# Patient Record
Sex: MALE | URBAN - METROPOLITAN AREA
[De-identification: names, ages, dates, MRNs, and addresses within clinical notes are randomized per-mention and may not be internally consistent; named-entity substitution may affect disease eponyms.]

---

## 2017-09-08 ENCOUNTER — OFFICE VISIT (OUTPATIENT)
Dept: OTOLARYNGOLOGY | Facility: CLINIC | Age: 67
End: 2017-09-08

## 2017-09-08 VITALS
SYSTOLIC BLOOD PRESSURE: 119 MMHG | DIASTOLIC BLOOD PRESSURE: 79 MMHG | HEIGHT: 67 IN | BODY MASS INDEX: 29.82 KG/M2 | TEMPERATURE: 98 F | WEIGHT: 190 LBS

## 2017-09-08 DIAGNOSIS — R05.9 COUGH: Primary | ICD-10-CM

## 2017-09-08 PROCEDURE — 31575 DIAGNOSTIC LARYNGOSCOPY: CPT | Performed by: OTOLARYNGOLOGY

## 2017-09-08 PROCEDURE — 99212 OFFICE O/P EST SF 10 MIN: CPT | Performed by: OTOLARYNGOLOGY

## 2017-09-08 PROCEDURE — 99203 OFFICE O/P NEW LOW 30 MIN: CPT | Performed by: OTOLARYNGOLOGY

## 2017-09-08 RX ORDER — LORATADINE 10 MG/1
10 TABLET ORAL DAILY
Qty: 30 TABLET | Refills: 3 | Status: SHIPPED | OUTPATIENT
Start: 2017-09-08

## 2017-09-08 RX ORDER — MONTELUKAST SODIUM 10 MG/1
10 TABLET ORAL NIGHTLY
Qty: 30 TABLET | Refills: 3 | Status: SHIPPED | OUTPATIENT
Start: 2017-09-08

## 2017-09-08 RX ORDER — FLUTICASONE PROPIONATE 50 MCG
1 SPRAY, SUSPENSION (ML) NASAL 2 TIMES DAILY
Qty: 1 BOTTLE | Refills: 3 | Status: SHIPPED | OUTPATIENT
Start: 2017-09-08

## 2020-07-06 NOTE — PROGRESS NOTES
Nathan Sims is a 61year old male. Patient presents with:  Snoring: currently using CPAP machine,  Cough: on and off for a while      HISTORY OF PRESENT ILLNESS  10/28/11  He presents with an eight-day history of sudden onset imbalance.  This first Negative Chest pain, irregular heartbeat/palpitations and syncope. GI Negative Abdominal pain and diarrhea. Endocrine Negative Cold intolerance and heat intolerance. Neuro Negative Tremors. Psych Negative Anxiety and depression.    Integumentary Neg Correct side and site confirmed. A topical spray of ). 25% Neosynephrine was sprayed into the nose. Laryngoscopy:  Flexible Fiberoptic Laryngoscopy: A diagnostic flexible fiberoptic laryngoscopy was performed.  The flexible fiberoptic laryngoscope wa none

## 2023-01-10 ENCOUNTER — OFFICE VISIT (OUTPATIENT)
Dept: INTERNAL MEDICINE CLINIC | Facility: CLINIC | Age: 73
End: 2023-01-10
Payer: COMMERCIAL

## 2023-01-10 VITALS
HEART RATE: 93 BPM | HEIGHT: 67 IN | DIASTOLIC BLOOD PRESSURE: 78 MMHG | BODY MASS INDEX: 28.72 KG/M2 | WEIGHT: 183 LBS | SYSTOLIC BLOOD PRESSURE: 130 MMHG

## 2023-01-10 DIAGNOSIS — Z79.4 TYPE 2 DIABETES MELLITUS WITHOUT COMPLICATION, WITH LONG-TERM CURRENT USE OF INSULIN (HCC): Primary | ICD-10-CM

## 2023-01-10 DIAGNOSIS — E11.9 TYPE 2 DIABETES MELLITUS WITHOUT COMPLICATION, WITH LONG-TERM CURRENT USE OF INSULIN (HCC): Primary | ICD-10-CM

## 2023-01-10 DIAGNOSIS — L84 FOOT CALLUS: ICD-10-CM

## 2023-01-10 DIAGNOSIS — E11.9 ENCOUNTER FOR DIABETIC FOOT EXAM (HCC): ICD-10-CM

## 2023-01-10 RX ORDER — PERPHENAZINE 16 MG/1
TABLET, FILM COATED ORAL
Qty: 300 STRIP | Refills: 3 | Status: SHIPPED | OUTPATIENT
Start: 2023-01-10

## 2023-01-10 RX ORDER — PERPHENAZINE 16 MG/1
TABLET, FILM COATED ORAL
COMMUNITY
End: 2023-01-10

## 2023-01-12 ENCOUNTER — LAB ENCOUNTER (OUTPATIENT)
Dept: LAB | Age: 73
End: 2023-01-12
Attending: INTERNAL MEDICINE
Payer: COMMERCIAL

## 2023-01-12 DIAGNOSIS — E11.9 TYPE 2 DIABETES MELLITUS WITHOUT COMPLICATION, WITH LONG-TERM CURRENT USE OF INSULIN (HCC): ICD-10-CM

## 2023-01-12 DIAGNOSIS — Z79.4 TYPE 2 DIABETES MELLITUS WITHOUT COMPLICATION, WITH LONG-TERM CURRENT USE OF INSULIN (HCC): ICD-10-CM

## 2023-01-12 LAB
ALBUMIN SERPL-MCNC: 3.6 G/DL (ref 3.4–5)
ALBUMIN/GLOB SERPL: 1 {RATIO} (ref 1–2)
ALP LIVER SERPL-CCNC: 66 U/L
ALT SERPL-CCNC: 20 U/L
ANION GAP SERPL CALC-SCNC: 9 MMOL/L (ref 0–18)
AST SERPL-CCNC: 9 U/L (ref 15–37)
BASOPHILS # BLD AUTO: 0.07 X10(3) UL (ref 0–0.2)
BASOPHILS NFR BLD AUTO: 1.2 %
BILIRUB SERPL-MCNC: 0.9 MG/DL (ref 0.1–2)
BUN BLD-MCNC: 17 MG/DL (ref 7–18)
BUN/CREAT SERPL: 16.8 (ref 10–20)
CALCIUM BLD-MCNC: 9.2 MG/DL (ref 8.5–10.1)
CHLORIDE SERPL-SCNC: 103 MMOL/L (ref 98–112)
CHOLEST SERPL-MCNC: 165 MG/DL (ref ?–200)
CO2 SERPL-SCNC: 24 MMOL/L (ref 21–32)
CREAT BLD-MCNC: 1.01 MG/DL
CREAT UR-SCNC: 171 MG/DL
DEPRECATED RDW RBC AUTO: 38.2 FL (ref 35.1–46.3)
EOSINOPHIL # BLD AUTO: 0.19 X10(3) UL (ref 0–0.7)
EOSINOPHIL NFR BLD AUTO: 3.2 %
ERYTHROCYTE [DISTWIDTH] IN BLOOD BY AUTOMATED COUNT: 12.2 % (ref 11–15)
EST. AVERAGE GLUCOSE BLD GHB EST-MCNC: 166 MG/DL (ref 68–126)
FASTING PATIENT LIPID ANSWER: YES
FASTING STATUS PATIENT QL REPORTED: YES
GFR SERPLBLD BASED ON 1.73 SQ M-ARVRAT: 80 ML/MIN/1.73M2 (ref 60–?)
GLOBULIN PLAS-MCNC: 3.7 G/DL (ref 2.8–4.4)
GLUCOSE BLD-MCNC: 131 MG/DL (ref 70–99)
HBA1C MFR BLD: 7.4 % (ref ?–5.7)
HCT VFR BLD AUTO: 41.4 %
HDLC SERPL-MCNC: 42 MG/DL (ref 40–59)
HGB BLD-MCNC: 14 G/DL
IMM GRANULOCYTES # BLD AUTO: 0.01 X10(3) UL (ref 0–1)
IMM GRANULOCYTES NFR BLD: 0.2 %
LDLC SERPL CALC-MCNC: 103 MG/DL (ref ?–100)
LYMPHOCYTES # BLD AUTO: 2.3 X10(3) UL (ref 1–4)
LYMPHOCYTES NFR BLD AUTO: 39.1 %
MCH RBC QN AUTO: 28.9 PG (ref 26–34)
MCHC RBC AUTO-ENTMCNC: 33.8 G/DL (ref 31–37)
MCV RBC AUTO: 85.5 FL
MICROALBUMIN UR-MCNC: 0.87 MG/DL
MICROALBUMIN/CREAT 24H UR-RTO: 5.1 UG/MG (ref ?–30)
MONOCYTES # BLD AUTO: 0.55 X10(3) UL (ref 0.1–1)
MONOCYTES NFR BLD AUTO: 9.4 %
NEUTROPHILS # BLD AUTO: 2.76 X10 (3) UL (ref 1.5–7.7)
NEUTROPHILS # BLD AUTO: 2.76 X10(3) UL (ref 1.5–7.7)
NEUTROPHILS NFR BLD AUTO: 46.9 %
NONHDLC SERPL-MCNC: 123 MG/DL (ref ?–130)
OSMOLALITY SERPL CALC.SUM OF ELEC: 285 MOSM/KG (ref 275–295)
PLATELET # BLD AUTO: 213 10(3)UL (ref 150–450)
POTASSIUM SERPL-SCNC: 4.3 MMOL/L (ref 3.5–5.1)
PROT SERPL-MCNC: 7.3 G/DL (ref 6.4–8.2)
RBC # BLD AUTO: 4.84 X10(6)UL
SODIUM SERPL-SCNC: 136 MMOL/L (ref 136–145)
TRIGL SERPL-MCNC: 107 MG/DL (ref 30–149)
TSI SER-ACNC: 1.36 MIU/ML (ref 0.36–3.74)
VLDLC SERPL CALC-MCNC: 18 MG/DL (ref 0–30)
WBC # BLD AUTO: 5.9 X10(3) UL (ref 4–11)

## 2023-01-12 PROCEDURE — 80061 LIPID PANEL: CPT

## 2023-01-12 PROCEDURE — 83036 HEMOGLOBIN GLYCOSYLATED A1C: CPT

## 2023-01-12 PROCEDURE — 85025 COMPLETE CBC W/AUTO DIFF WBC: CPT

## 2023-01-12 PROCEDURE — 82043 UR ALBUMIN QUANTITATIVE: CPT

## 2023-01-12 PROCEDURE — 84443 ASSAY THYROID STIM HORMONE: CPT

## 2023-01-12 PROCEDURE — 36415 COLL VENOUS BLD VENIPUNCTURE: CPT

## 2023-01-12 PROCEDURE — 82570 ASSAY OF URINE CREATININE: CPT

## 2023-01-12 PROCEDURE — 80053 COMPREHEN METABOLIC PANEL: CPT

## 2023-01-31 ENCOUNTER — OFFICE VISIT (OUTPATIENT)
Dept: PODIATRY CLINIC | Facility: CLINIC | Age: 73
End: 2023-01-31

## 2023-01-31 DIAGNOSIS — M79.674 PAIN IN TOES OF BOTH FEET: ICD-10-CM

## 2023-01-31 DIAGNOSIS — B35.1 ONYCHOMYCOSIS: Primary | ICD-10-CM

## 2023-01-31 DIAGNOSIS — M79.675 PAIN IN TOES OF BOTH FEET: ICD-10-CM

## 2023-01-31 DIAGNOSIS — E11.9 TYPE 2 DIABETES MELLITUS WITHOUT RETINOPATHY (HCC): ICD-10-CM

## 2023-01-31 PROCEDURE — 11721 DEBRIDE NAIL 6 OR MORE: CPT | Performed by: PODIATRIST

## 2023-01-31 PROCEDURE — 99203 OFFICE O/P NEW LOW 30 MIN: CPT | Performed by: PODIATRIST

## 2023-02-21 ENCOUNTER — MED REC SCAN ONLY (OUTPATIENT)
Dept: INTERNAL MEDICINE CLINIC | Facility: CLINIC | Age: 73
End: 2023-02-21

## 2023-02-22 ENCOUNTER — OFFICE VISIT (OUTPATIENT)
Dept: OPHTHALMOLOGY | Facility: CLINIC | Age: 73
End: 2023-02-22

## 2023-02-22 DIAGNOSIS — E11.9 TYPE 2 DIABETES MELLITUS WITHOUT RETINOPATHY (HCC): Primary | ICD-10-CM

## 2023-02-22 DIAGNOSIS — H25.13 AGE-RELATED NUCLEAR CATARACT OF BOTH EYES: ICD-10-CM

## 2023-02-22 DIAGNOSIS — H43.392 VITREOUS FLOATERS OF LEFT EYE: ICD-10-CM

## 2023-02-22 PROCEDURE — 92015 DETERMINE REFRACTIVE STATE: CPT | Performed by: OPHTHALMOLOGY

## 2023-02-22 PROCEDURE — 92004 COMPRE OPH EXAM NEW PT 1/>: CPT | Performed by: OPHTHALMOLOGY

## 2023-02-22 PROCEDURE — 2023F DILAT RTA XM W/O RTNOPTHY: CPT | Performed by: OPHTHALMOLOGY

## 2023-02-22 NOTE — ASSESSMENT & PLAN NOTE
Discussed mild cataracts with patient. No treatment recommended at this time.      New glasses Rx given today, recommend update

## 2023-02-22 NOTE — PATIENT INSTRUCTIONS
Type 2 diabetes mellitus without retinopathy ([facility] [address]  Diabetes type II: no background of retinopathy, no signs of neovascularization noted. Discussed ocular and systemic benefits of blood sugar control. Diagnosis and treatment discussed in detail with patient. Will see patient in 1 year for a diabetic exam    Age-related nuclear cataract of both eyes  Discussed mild cataracts with patient. No treatment recommended at this time. New glasses Rx given today, recommend update      Vitreous floaters of left eye   There is no evidence of retinal pathology. All signs and symptoms of retinal detachment/tears explained in detail. Patient instructed to call the office if they experience increase in floaters, increase in flashes of light, loss of vision or curtain or veil effect.

## 2023-08-03 ENCOUNTER — MED REC SCAN ONLY (OUTPATIENT)
Dept: INTERNAL MEDICINE CLINIC | Facility: CLINIC | Age: 73
End: 2023-08-03

## 2023-08-18 DIAGNOSIS — E11.9 TYPE 2 DIABETES MELLITUS WITHOUT COMPLICATION, WITH LONG-TERM CURRENT USE OF INSULIN (HCC): ICD-10-CM

## 2023-08-18 DIAGNOSIS — Z79.4 TYPE 2 DIABETES MELLITUS WITHOUT COMPLICATION, WITH LONG-TERM CURRENT USE OF INSULIN (HCC): ICD-10-CM

## 2023-08-18 NOTE — TELEPHONE ENCOUNTER
Failed Protocol/No Protocol.     Requested Prescriptions   Pending Prescriptions Disp Refills    METFORMIN 500 MG Oral Tab [Pharmacy Med Name: METFORMIN  MG TABLET] 270 tablet 1     Sig: TAKE 1 TABLET BY MOUTH DAILY WITH BREAKFAST AND 2 TABLETS WITH DINNER       Diabetes Medication Protocol Failed - 8/18/2023 12:22 AM        Failed - Last A1C < 7.5 and within past 6 months     Lab Results   Component Value Date    A1C 7.4 (H) 01/12/2023             Passed - In person appointment or virtual visit in the past 6 mos or appointment in next 3 mos     Recent Outpatient Visits              5 months ago Type 2 diabetes mellitus without retinopathy ([facility] [address]    [facility]    [facility]    5 months ago Reactive depression    [address], [facility], [place] Leonides Gouge, MD    Office Visit    6 months ago [facility], [place], [place], [place]    Office Visit    7 months ago Type 2 diabetes mellitus without complication, with long-term current use of insulin ([address]    [address],[facility], [facility], [place] Leonides Gouge, MD    Office Visit    5 years ago [facility]ough    [facility]    [facility]                      Passed - [facility] or GFRNAA > 50     GFR Evaluation  EGFRCR: 80 , resulted on 1/12/2023          Passed - GFR in the past 12 months               Recent Outpatient Visits              5 months ago Type 2 diabetes mellitus without retinopathy ([address]    [facility]    Office Visit    5 months ago Reactive depression    [address] [facility], [place] Leonides Gouge, MD    Office Visit    6 months ago [facility]    [facility], [place], [place], [place]    Office Visit    7 months ago Type 2 diabetes mellitus without complication, with long-term current use of insulin (HCC)    [address], [address], [place] Joaquina Carbone MD    Office Visit    5 years ago [facility], [address], Ina Landaverde MD    Office Visit

## 2023-12-20 ENCOUNTER — TELEPHONE (OUTPATIENT)
Dept: INTERNAL MEDICINE CLINIC | Facility: CLINIC | Age: 73
End: 2023-12-20

## 2023-12-20 DIAGNOSIS — E11.9 TYPE 2 DIABETES MELLITUS WITHOUT COMPLICATION, WITH LONG-TERM CURRENT USE OF INSULIN (HCC): Primary | ICD-10-CM

## 2023-12-20 DIAGNOSIS — Z79.4 TYPE 2 DIABETES MELLITUS WITHOUT COMPLICATION, WITH LONG-TERM CURRENT USE OF INSULIN (HCC): Primary | ICD-10-CM

## 2024-01-18 ENCOUNTER — LAB ENCOUNTER (OUTPATIENT)
Dept: LAB | Age: 74
End: 2024-01-18
Attending: INTERNAL MEDICINE
Payer: COMMERCIAL

## 2024-01-18 DIAGNOSIS — E11.9 TYPE 2 DIABETES MELLITUS WITHOUT COMPLICATION, WITH LONG-TERM CURRENT USE OF INSULIN (HCC): ICD-10-CM

## 2024-01-18 DIAGNOSIS — Z79.4 TYPE 2 DIABETES MELLITUS WITHOUT COMPLICATION, WITH LONG-TERM CURRENT USE OF INSULIN (HCC): ICD-10-CM

## 2024-01-18 LAB
ALBUMIN SERPL-MCNC: 4.2 G/DL (ref 3.2–4.8)
ALBUMIN/GLOB SERPL: 1.4 {RATIO} (ref 1–2)
ALP LIVER SERPL-CCNC: 68 U/L
ALT SERPL-CCNC: 14 U/L
ANION GAP SERPL CALC-SCNC: 3 MMOL/L (ref 0–18)
AST SERPL-CCNC: 12 U/L (ref ?–34)
BASOPHILS # BLD AUTO: 0.08 X10(3) UL (ref 0–0.2)
BASOPHILS NFR BLD AUTO: 1.4 %
BILIRUB SERPL-MCNC: 1 MG/DL (ref 0.2–1.1)
BUN BLD-MCNC: 14 MG/DL (ref 9–23)
BUN/CREAT SERPL: 12.5 (ref 10–20)
CALCIUM BLD-MCNC: 9.3 MG/DL (ref 8.7–10.4)
CHLORIDE SERPL-SCNC: 103 MMOL/L (ref 98–112)
CHOLEST SERPL-MCNC: 187 MG/DL (ref ?–200)
CO2 SERPL-SCNC: 30 MMOL/L (ref 21–32)
CREAT BLD-MCNC: 1.12 MG/DL
CREAT UR-SCNC: 66 MG/DL
DEPRECATED RDW RBC AUTO: 38.8 FL (ref 35.1–46.3)
EGFRCR SERPLBLD CKD-EPI 2021: 70 ML/MIN/1.73M2 (ref 60–?)
EOSINOPHIL # BLD AUTO: 0.34 X10(3) UL (ref 0–0.7)
EOSINOPHIL NFR BLD AUTO: 5.8 %
ERYTHROCYTE [DISTWIDTH] IN BLOOD BY AUTOMATED COUNT: 12.6 % (ref 11–15)
EST. AVERAGE GLUCOSE BLD GHB EST-MCNC: 154 MG/DL (ref 68–126)
FASTING PATIENT LIPID ANSWER: YES
FASTING STATUS PATIENT QL REPORTED: YES
GLOBULIN PLAS-MCNC: 3 G/DL (ref 2.8–4.4)
GLUCOSE BLD-MCNC: 128 MG/DL (ref 70–99)
HBA1C MFR BLD: 7 % (ref ?–5.7)
HCT VFR BLD AUTO: 43.3 %
HDLC SERPL-MCNC: 41 MG/DL (ref 40–59)
HGB BLD-MCNC: 14.6 G/DL
IMM GRANULOCYTES # BLD AUTO: 0.01 X10(3) UL (ref 0–1)
IMM GRANULOCYTES NFR BLD: 0.2 %
LDLC SERPL CALC-MCNC: 128 MG/DL (ref ?–100)
LYMPHOCYTES # BLD AUTO: 2.23 X10(3) UL (ref 1–4)
LYMPHOCYTES NFR BLD AUTO: 38.1 %
MCH RBC QN AUTO: 28.5 PG (ref 26–34)
MCHC RBC AUTO-ENTMCNC: 33.7 G/DL (ref 31–37)
MCV RBC AUTO: 84.4 FL
MICROALBUMIN UR-MCNC: <0.3 MG/DL
MONOCYTES # BLD AUTO: 0.49 X10(3) UL (ref 0.1–1)
MONOCYTES NFR BLD AUTO: 8.4 %
NEUTROPHILS # BLD AUTO: 2.7 X10 (3) UL (ref 1.5–7.7)
NEUTROPHILS # BLD AUTO: 2.7 X10(3) UL (ref 1.5–7.7)
NEUTROPHILS NFR BLD AUTO: 46.1 %
NONHDLC SERPL-MCNC: 146 MG/DL (ref ?–130)
OSMOLALITY SERPL CALC.SUM OF ELEC: 284 MOSM/KG (ref 275–295)
PLATELET # BLD AUTO: 243 10(3)UL (ref 150–450)
POTASSIUM SERPL-SCNC: 4.7 MMOL/L (ref 3.5–5.1)
PROT SERPL-MCNC: 7.2 G/DL (ref 5.7–8.2)
RBC # BLD AUTO: 5.13 X10(6)UL
SODIUM SERPL-SCNC: 136 MMOL/L (ref 136–145)
TRIGL SERPL-MCNC: 100 MG/DL (ref 30–149)
VLDLC SERPL CALC-MCNC: 18 MG/DL (ref 0–30)
WBC # BLD AUTO: 5.9 X10(3) UL (ref 4–11)

## 2024-01-18 PROCEDURE — 82570 ASSAY OF URINE CREATININE: CPT

## 2024-01-18 PROCEDURE — 80053 COMPREHEN METABOLIC PANEL: CPT

## 2024-01-18 PROCEDURE — 36415 COLL VENOUS BLD VENIPUNCTURE: CPT

## 2024-01-18 PROCEDURE — 83036 HEMOGLOBIN GLYCOSYLATED A1C: CPT

## 2024-01-18 PROCEDURE — 80061 LIPID PANEL: CPT

## 2024-01-18 PROCEDURE — 82043 UR ALBUMIN QUANTITATIVE: CPT

## 2024-01-18 PROCEDURE — 85025 COMPLETE CBC W/AUTO DIFF WBC: CPT

## 2024-02-01 ENCOUNTER — LAB SERVICES (OUTPATIENT)
Dept: LAB | Age: 74
End: 2024-02-01

## 2024-02-15 ENCOUNTER — OFFICE VISIT (OUTPATIENT)
Dept: INTERNAL MEDICINE CLINIC | Facility: CLINIC | Age: 74
End: 2024-02-15
Payer: MEDICARE

## 2024-02-15 VITALS
BODY MASS INDEX: 27.78 KG/M2 | DIASTOLIC BLOOD PRESSURE: 84 MMHG | SYSTOLIC BLOOD PRESSURE: 137 MMHG | HEIGHT: 67 IN | HEART RATE: 75 BPM | WEIGHT: 177 LBS

## 2024-02-15 DIAGNOSIS — E11.9 DIABETIC EYE EXAM (HCC): ICD-10-CM

## 2024-02-15 DIAGNOSIS — Z79.4 TYPE 2 DIABETES MELLITUS WITHOUT COMPLICATION, WITH LONG-TERM CURRENT USE OF INSULIN (HCC): Primary | ICD-10-CM

## 2024-02-15 DIAGNOSIS — E78.00 PURE HYPERCHOLESTEROLEMIA: ICD-10-CM

## 2024-02-15 DIAGNOSIS — H25.13 AGE-RELATED NUCLEAR CATARACT OF BOTH EYES: ICD-10-CM

## 2024-02-15 DIAGNOSIS — Z01.00 DIABETIC EYE EXAM (HCC): ICD-10-CM

## 2024-02-15 DIAGNOSIS — E11.9 TYPE 2 DIABETES MELLITUS WITHOUT COMPLICATION, WITH LONG-TERM CURRENT USE OF INSULIN (HCC): Primary | ICD-10-CM

## 2024-02-15 PROBLEM — E78.1 PURE HYPERTRIGLYCERIDEMIA: Status: ACTIVE | Noted: 2024-02-15

## 2024-02-15 PROCEDURE — 99214 OFFICE O/P EST MOD 30 MIN: CPT | Performed by: INTERNAL MEDICINE

## 2024-02-15 NOTE — PROGRESS NOTES
Subjective:   Patient ID: Anjamraj Liam is a 73 year old male.  Chief Complaint   Patient presents with    Test Results     Here to discuss test results from 1/18/24     Patient presents today for f/u  exam after labs .patient   states doing well otherwise, patient states he is taking his medications regularly is most of the time using his metformin 500 mg twice daily -patient states he is monitoring his sugars and they are in pretty good range at this time.    denies chest pain, shortness of breath, dyspnea on exertion or heart palpitations also denies nausea, vomiting, diarrhea, constipation or abdominal pain. No fever, chills, or UTI symptoms.   The rest of the review of systems, see below.       HPI    History/Other:   Review of Systems   Constitutional:  Negative for chills, fatigue and fever.   HENT:  Negative for ear pain and sore throat.    Eyes:  Negative for pain and redness.   Respiratory:  Negative for cough, shortness of breath and wheezing.    Cardiovascular:  Negative for chest pain, palpitations and leg swelling.   Gastrointestinal:  Negative for abdominal pain, constipation, diarrhea, nausea and vomiting.   Genitourinary:  Negative for dysuria and frequency.   Skin:  Negative for pallor.   Neurological:  Negative for dizziness and headaches.   Psychiatric/Behavioral:  Negative for confusion. The patient is not nervous/anxious.      Current Outpatient Medications   Medication Sig Dispense Refill    metFORMIN 500 MG Oral Tab TAKE 1 TABLET BY MOUTH DAILY WITH BREAKFAST AND 2 TABLETS WITH DINNER 270 tablet 1    Glucose Blood (CONTOUR NEXT TEST) In Vitro Strip One test every other day 300 strip 3     Allergies:No Known Allergies    Objective:   Physical Exam  Vitals and nursing note reviewed.   Constitutional:       General: He is not in acute distress.     Appearance: He is well-developed.      Interventions: Face mask in place.   HENT:      Head: Normocephalic and atraumatic.      Right Ear:  Tympanic membrane, ear canal and external ear normal.      Left Ear: Tympanic membrane, ear canal and external ear normal.      Nose:      Right Sinus: No maxillary sinus tenderness or frontal sinus tenderness.      Left Sinus: No maxillary sinus tenderness or frontal sinus tenderness.   Eyes:      General: No scleral icterus.        Right eye: No discharge.         Left eye: No discharge.   Neck:      Thyroid: No thyromegaly.      Vascular: No JVD.   Cardiovascular:      Rate and Rhythm: Normal rate and regular rhythm.      Heart sounds: Normal heart sounds. No murmur heard.  Pulmonary:      Effort: Pulmonary effort is normal. No respiratory distress.      Breath sounds: Normal breath sounds. No wheezing or rales.   Abdominal:      Palpations: Abdomen is soft. There is no mass.      Tenderness: There is no abdominal tenderness. There is no right CVA tenderness or left CVA tenderness.   Musculoskeletal:      Cervical back: Neck supple.      Right lower leg: No edema.      Left lower leg: No edema.   Lymphadenopathy:      Cervical: No cervical adenopathy.   Skin:     General: Skin is warm and dry.   Neurological:      Mental Status: He is alert and oriented to person, place, and time.   Psychiatric:         Behavior: Behavior normal.      Comments:        Blood pressure 137/84, pulse 75, height 5' 7\" (1.702 m), weight 177 lb (80.3 kg).  Bilateral barefoot skin diabetic exam is normal, visualized feet and the appearance is normal.  Bilateral monofilament/sensation of both feet is normal.  Pulsation pedal pulse exam of both lower legs/feet is normal as well.         Assessment & Plan:        Type 2 diabetes mellitus without complication, with long-term current use of insulin (HCC)  (primary encounter diagnosis)    Keep sugars glycemic control to prevent complications of DM2  Keep 1800 chicho ADA- Diabetic diet   Walking and activity as tolerated   accu-checks   Eye exam 2022 referred  -to have regular eye exams patient  says he has appointment with eye doctor tomorrow   and foot exam yearly -refer to podiatry  Take medications as prescribed  metformin 500  Mg w  breakfast and 1-   Tab With diner   Last A1c is at 7.0  1/24  keep with low sugar and carbohydrate diet continue with metformin 1 tablet in the morning and 1 tablets at night of the sugars.  Keep the fasting sugar in the 120-130 range, 2 hours after meal less than 140 preferably 120- 130 range   Labs to complete 3 months   Discussed with patient regarding ACE inhibitor as kidney protection-due to diabetes starting a small dosage patient is not interested at this time-.  Directions and side effects of medications discussed w/pt  Pt verbalized understanding and compliance      Hyperlipidemia  Keep low saturated fat  diet   Avoid red meat and fast, fried food  Take fruits and vegetables   Keep active /walking ,exercise as  tolerated  Reach healthy weight   Continue present management   Labs  To comlete   Patient again refused to take any cholesterol medication even though he has diabetes.    Patient states he tried medication before and he did not feel good on the statins he reviewed refused to take any medication at this time.  Within 3 months     Recommend patient to have the physical exam scheduled    f/u blood test  in  3 months   Patient agrees with plan verbalized understanding and compliance          No orders of the defined types were placed in this encounter.      Meds This Visit:  Requested Prescriptions      No prescriptions requested or ordered in this encounter       Imaging & Referrals:  None

## 2024-04-08 DIAGNOSIS — E11.9 TYPE 2 DIABETES MELLITUS WITHOUT COMPLICATION, WITH LONG-TERM CURRENT USE OF INSULIN (HCC): ICD-10-CM

## 2024-04-08 DIAGNOSIS — Z79.4 TYPE 2 DIABETES MELLITUS WITHOUT COMPLICATION, WITH LONG-TERM CURRENT USE OF INSULIN (HCC): ICD-10-CM

## 2024-04-09 NOTE — TELEPHONE ENCOUNTER
Refill passed per [facility] protocol.  Requested Prescriptions   Pending Prescriptions Disp Refills    CONTOUR NEXT TEST In Vitro Strip [Pharmacy Med Name: CONTOUR NEXT TEST STRIP] 50 strip 23     Sig: USE TO TEST EVERY OTHER DAY       Diabetic Supplies Protocol Passed - 4/8/2024 12:42 AM        Passed - In person appointment or virtual visit in the past 12 mos or appointment in next 3 mos     Recent Outpatient Visits              1 month ago Type 2 diabetes mellitus without complication, with long-term current use of insulin ([facility])    [facility], Keyanna Garrison MD    Office Visit    1 year ago Type 2 diabetes mellitus without retinopathy ([facility])    [facility]    Office Visit    1 year ago Reactive depression    [facility] [place] V[place]omanovic, Emela, MD    Office Visit    1 year ago Onychomycosis    [facility] [address], [place] Aravind Madison, DPM    Office Visit    1 year ago Type 2 diabetes mellitus without complication, with long-term current use of insulin ([facility])    [facility] [place], Emela, MD    Office Visit                         Recent Outpatient Visits              1 month ago Type 2 diabetes mellitus without complication, with long-term current use of insulin ([facility])    [facility], Keyanna Garrison MD    Office Visit    1 year ago Type 2 diabetes mellitus without retinopathy ([facility])    [facility] Artem Lilly MD    Office Visit    1 year ago Reactive depression    [facility] [place] V[place]omanovic, Emela, MD    Office Visit    1 year ago [facility]ychomycosis    [facility]    Office Visit    1 year ago Type 2  diabetes mellitus without complication, with long-term current use of insulin (HCC)    [facility], [address], [place] Keyanna Foster MD    Office Visit

## 2024-08-15 ENCOUNTER — TELEPHONE (OUTPATIENT)
Dept: INTERNAL MEDICINE CLINIC | Facility: CLINIC | Age: 74
End: 2024-08-15

## 2024-08-15 DIAGNOSIS — Z79.4 TYPE 2 DIABETES MELLITUS WITHOUT COMPLICATION, WITH LONG-TERM CURRENT USE OF INSULIN (HCC): ICD-10-CM

## 2024-08-15 DIAGNOSIS — E11.9 TYPE 2 DIABETES MELLITUS WITHOUT COMPLICATION, WITH LONG-TERM CURRENT USE OF INSULIN (HCC): ICD-10-CM

## 2024-08-20 NOTE — TELEPHONE ENCOUNTER
Please review; protocol failed/ has no protocol      Message sent for patient to have labs done.  Message sent for patient to make an appointment.       Requested Prescriptions   Pending Prescriptions Disp Refills    METFORMIN 500 MG Oral Tab [Pharmacy Med Name: METFORMIN  MG TABLET] 270 tablet 1     Sig: TAKE 1 TABLET BY MOUTH DAILY WITH BREAKFAST AND 2 TABLETS WITH DINNER       Diabetes Medication Protocol Failed - 8/20/2024  8:45 AM        Failed - Last A1C < 7.5 and within past 6 months     Lab Results   Component Value Date    A1C 7.0 (H) 01/18/2024             Failed - In person appointment or virtual visit in the past 6 mos or appointment in next 3 mos     Recent Outpatient Visits              6 months ago Type 2 diabetes mellitus without complication, with long-term current use of insulin ([facility])    [facility] Keyanna Garrison MD    Office Visit    1 year ago Type 2 diabetes mellitus without retinopathy ([facility])    [facility] Artem Lilly MD    Office Visit    1 year ago Reactive depression    [facility]    Office Visit    1 year ago Onychomycosis    [facility]    Office Visit    1 year ago Type 2 diabetes mellitus without complication, with long-term current use of insulin ([facility])    [facility] [place] V[place]oman[place], Emela, MD    Office Visit                      Passed - Microalbumin procedure in past 12 months or taking ACE/ARB        Passed - EGFRCR or GFRNAA > 50     GFR Evaluation  EGFRCR: 70 , resulted on 1/18/2024          Passed - GFR in the past 12 months           Recent Outpatient Visits              6 months ago Type 2 diabetes mellitus without complication, with long-term current use of insulin ([facility])    [facility]  [place], Keyanna Garrison MD    Office Visit    1 year ago Type 2 diabetes mellitus without retinopathy ([facility])    [facility], Artem Lilly MD    Office Visit    1 year ago Reactive depression    [facility], [place] V[place], Emela, MD    Office Visit    1 year ago Onychomycosis    [facility], [address], [place] Aravind Madison DPM    Office Visit    1 year ago Type 2 diabetes mellitus without complication, with long-term current use of insulin ([facility])    [facility], [place] Vukomanovic, Emela, MD    Office Visit

## 2024-09-17 ENCOUNTER — HOSPITAL ENCOUNTER (OUTPATIENT)
Age: 74
Discharge: HOME | End: 2024-09-17
Attending: STUDENT IN AN ORGANIZED HEALTH CARE EDUCATION/TRAINING PROGRAM
Payer: MEDICARE

## 2024-09-17 VITALS
DIASTOLIC BLOOD PRESSURE: 71 MMHG | RESPIRATION RATE: 20 BRPM | TEMPERATURE: 98 F | HEART RATE: 74 BPM | SYSTOLIC BLOOD PRESSURE: 135 MMHG | OXYGEN SATURATION: 99 %

## 2024-09-17 DIAGNOSIS — J00 ACUTE RHINITIS: ICD-10-CM

## 2024-09-17 DIAGNOSIS — J06.9 VIRAL URI WITH COUGH: ICD-10-CM

## 2024-09-17 DIAGNOSIS — U07.1 COVID: Primary | ICD-10-CM

## 2024-09-17 LAB
GLUCOSE BLDC GLUCOMTR-MCNC: 123 MG/DL (ref 70–99)
SARS-COV-2 RNA RESP QL NAA+PROBE: DETECTED

## 2024-09-17 PROCEDURE — 99213 OFFICE O/P EST LOW 20 MIN: CPT

## 2024-09-17 PROCEDURE — 82962 GLUCOSE BLOOD TEST: CPT

## 2024-09-17 PROCEDURE — 99203 OFFICE O/P NEW LOW 30 MIN: CPT

## 2024-09-17 RX ORDER — BENZONATATE 100 MG/1
100 CAPSULE ORAL 3 TIMES DAILY PRN
Qty: 9 CAPSULE | Refills: 0 | Status: SHIPPED | OUTPATIENT
Start: 2024-09-17 | End: 2024-09-20

## 2024-09-17 NOTE — DISCHARGE INSTRUCTIONS
At this time your exam and evaluation are consistent with a viral infection. Viral infections do not respond to antibiotics and are treated symptomatically. Ensure to rest and maintain hydration. Viral infections can progress and can lead to bacterial infections. If you develop any new, progressing, changing, or worsening signs or symptoms, please present immediately to your primary care physician for reassessment. If you develop any difficulty breathing, chest pain, shortness of breath, difficulty swallowing, drooling, signs or symptoms of dehydration, or any other concerning symptoms, call 911 or present immediately to the emergency department.     Your COVID test was positive.  As per the current, updated Center for Disease Control guidelines, \"People who test positive for COVID-19 should follow CDC guidance for preventing spread of respiratory viruses when sick. People with a respiratory virus should stay away from others until at least 24 hours after both symptoms are improving overall and they have not had a fever without the use of fever-reducing medication.  For 5 additional days, people should consider added precautions such as hygiene, use of masks, physical distancing, and steps for  air.\"

## 2024-09-17 NOTE — ED INITIAL ASSESSMENT (HPI)
Presents with 2 days of congestion, cough, and chills. No chest pain or SOB. Wife covid + yesterday. Requests accucheckmecca of DM.   
You can access the FollowMyHealth Patient Portal offered by [facility] by registering at the following website: [email or web address]. By joining [facility]’s FollowMyHealth portal, you will also be able to view your health information using other applications (apps) compatible with our system.

## 2024-09-17 NOTE — ED PROVIDER NOTES
Patient Seen in: [facility] [place]      History     Chief Complaint   Patient presents with    Cough/URI     Stated Complaint: Cough, cold,    Subjective:   HPI    74-year-old male with past medical history of diabetes, who presents with 2 days of cough and chills.  He denies any fevers, shortness of breath, chest pain, difficulty breathing, ear pain, or sore throat.  He has not noticed any significant nasal drainage.  He states his wife recently tested positive for COVID.  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  He requests an Accu-Chek assessment as he states he has a difficult time assessing his blood sugar at home but denies any symptoms of dehydration, lethargy, increased thirst, abdominal pain, or frequent urination.    Objective:   Past Medical History:    Diabetes (HCC)    Sleep apnea              History reviewed. No pertinent surgical history.             Social History     Socioeconomic History    Marital status:    Tobacco Use    Smoking status: Never    Smokeless tobacco: Never   Vaping Use    Vaping status: Never Used   Substance and Sexual Activity    Alcohol use: No    Drug use: No     Social Determinants of Health      [facility]    Social Connections    Received from [facility], [facility]    Housing Stability              Review of Systems    Positive for stated Chief Complaint: Cough/URI    Other systems are as noted in HPI.  Constitutional and vital signs reviewed.      All other systems reviewed and negative except as noted above.    Physical Exam     ED Triage Vitals [09/17/24 0806]   /71   Pulse 74   Resp 20   Temp 97.8 °F (36.6 °C)   Temp src Temporal   SpO2 99 %   O2 Device None (Room air)       Current Vitals:   Vital Signs  BP: 135/71  Pulse: 74  Resp: 20  Temp: 97.8 °F (36.6 °C)  Temp src: Temporal    Oxygen Therapy  SpO2: 99 %  O2 Device: None (Room  air)            Physical Exam    General: Awake, alert, comfortable on room air, in no distress, tolerating oral secretions, interactive  Pulmonary: Lungs CTA B, no wheezing, no conversational dyspnea, mild cough but no sign of distress  Neuro: symmetrical facial expressions on gross observation  HEENT: no periorbital edema or erythema, nonerythematous and nonedematous intact B/L TMs, no erythema or edema of the B/L ear canals, mildly erythematous and edematous nonocclusive B/L nasal turbinates, nonerythematous and nonedematous B/L tonsils, no tonsillar exudates, no peritonsillar edema, uvula midline, tolerating oral secretions, normal speech, no submandibular edema  Neck: No anterior or posterior cervical lymphadenopathy  Psych: Normal mood, normal affect    ED Course     Labs Reviewed   POCT GLUCOSE - Abnormal; Notable for the following components:       Result Value    POC Glucose  123 (*)     All other components within normal limits   RAPID SARS-COV-2 BY PCR - Abnormal; Notable for the following components:    Rapid SARS-CoV-2 by PCR Detected (*)     All other components within normal limits         MDM   Patient is awake, alert, comfortable on room air, in no distress, afebrile, lungs CTAB with no wheezing with no sign of acute bronchospasm, no sign of tonsillitis or PTA or deep space infection, no sign of otitis media, afebrile with no focal findings on lung exam and COVID exposure and therefore suspect viral URI with cough, no fever or focal findings to suggest a secondary bacterial infection of the lung/pneumonia at this time and symptoms have only been for 2 days  -Patient requested an Accu-Chek.  Unremarkable nonfasting blood sugar of 126.  We did discuss that infections can increase blood sugar.  Although, no hyperglycemia at this time, discussed signs and symptoms of hyperglycemia that would necessitate immediate assessment in the emergency department.  -Patient's COVID test is positive.  -We discussed  the current CDC guidelines for isolation.  Work note provided.  -We discussed symptomatic management with rest, hydration, and over the counter intranasal Flonase for rhinitis.  I have also prescribed Tessalon Perles for the patient's cough.  Patient states he is taking NyQuil.  We discussed that NyQuil can cause drowsiness and he should not drive while taking this medication.  We discussed that Tylenol/acetaminophen can be an ingredient in NyQuil and he should not take any extra Tylenol/acetaminophen so as to prevent overdose.  -We discussed that viral infections can progress and can lead to bacterial infections. We discussed that if the patient notices any new, progressing, or worsening signs or symptoms they should present immediately to their primary care physician for reassessment  -ED precautions discussed    Medical Decision Making  Amount and/or Complexity of Data Reviewed  Labs: ordered.    Risk  OTC drugs.  Prescription drug management.        Disposition and Plan     Clinical Impression:  1. COVID    2. Viral URI with cough    3. Acute rhinitis         Disposition:  Discharge  9/17/2024  8:34 am    Follow-up:  Keyanna Foster MD  [address] [place] [id number] [phone]    In 3 days  As needed, If symptoms worsen          Medications Prescribed:  Discharge Medication List as of 9/17/2024  8:35 AM            benzonatate 100 MG Oral Cap 9 capsule 0 9/17/2024 9/20/2024   Sig:   Take 1 capsule (100 mg total) by mouth 3 (three) times daily as needed for cough.     Route:   Oral     PRN Reason(s):   cough

## 2024-09-23 ENCOUNTER — TELEPHONE (OUTPATIENT)
Dept: INTERNAL MEDICINE CLINIC | Facility: CLINIC | Age: 74
End: 2024-09-23

## 2024-09-23 NOTE — TELEPHONE ENCOUNTER
Patient was in urgent care on 9/17/2024 for a cough. Symptoms have cleared up but wanted to schedule a follow up appointment for Thursday 9/26/2024, since he is off then. Appointment made for 9/26/2024 at 9:40am with Dr Page in [place].

## 2024-09-26 ENCOUNTER — OFFICE VISIT (OUTPATIENT)
Dept: INTERNAL MEDICINE CLINIC | Facility: CLINIC | Age: 74
End: 2024-09-26

## 2024-09-26 ENCOUNTER — LAB ENCOUNTER (OUTPATIENT)
Dept: LAB | Age: 74
End: 2024-09-26
Attending: INTERNAL MEDICINE
Payer: MEDICARE

## 2024-09-26 VITALS
SYSTOLIC BLOOD PRESSURE: 148 MMHG | BODY MASS INDEX: 29.63 KG/M2 | DIASTOLIC BLOOD PRESSURE: 78 MMHG | HEIGHT: 65.55 IN | WEIGHT: 180 LBS | RESPIRATION RATE: 18 BRPM | HEART RATE: 86 BPM

## 2024-09-26 DIAGNOSIS — Z79.4 TYPE 2 DIABETES MELLITUS WITHOUT COMPLICATION, WITH LONG-TERM CURRENT USE OF INSULIN (HCC): ICD-10-CM

## 2024-09-26 DIAGNOSIS — J20.9 ACUTE BRONCHITIS, UNSPECIFIED ORGANISM: Primary | ICD-10-CM

## 2024-09-26 DIAGNOSIS — E11.9 TYPE 2 DIABETES MELLITUS WITHOUT COMPLICATION, WITH LONG-TERM CURRENT USE OF INSULIN (HCC): ICD-10-CM

## 2024-09-26 DIAGNOSIS — U07.1 COVID-19: ICD-10-CM

## 2024-09-26 LAB
ALBUMIN SERPL-MCNC: 4.1 G/DL (ref 3.2–4.8)
ALBUMIN/GLOB SERPL: 1.2 {RATIO} (ref 1–2)
ALP LIVER SERPL-CCNC: 64 U/L
ALT SERPL-CCNC: 15 U/L
ANION GAP SERPL CALC-SCNC: 7 MMOL/L (ref 0–18)
AST SERPL-CCNC: 13 U/L (ref ?–34)
BILIRUB SERPL-MCNC: 1 MG/DL (ref 0.2–1.1)
BUN BLD-MCNC: 13 MG/DL (ref 9–23)
BUN/CREAT SERPL: 14.4 (ref 10–20)
CALCIUM BLD-MCNC: 9 MG/DL (ref 8.7–10.4)
CHLORIDE SERPL-SCNC: 102 MMOL/L (ref 98–112)
CHOLEST SERPL-MCNC: 178 MG/DL (ref ?–200)
CO2 SERPL-SCNC: 27 MMOL/L (ref 21–32)
CREAT BLD-MCNC: 0.9 MG/DL
EGFRCR SERPLBLD CKD-EPI 2021: 91 ML/MIN/1.73M2 (ref 60–?)
EST. AVERAGE GLUCOSE BLD GHB EST-MCNC: 169 MG/DL (ref 68–126)
FASTING PATIENT LIPID ANSWER: YES
FASTING STATUS PATIENT QL REPORTED: YES
GLOBULIN PLAS-MCNC: 3.3 G/DL (ref 2–3.5)
GLUCOSE BLD-MCNC: 132 MG/DL (ref 70–99)
HBA1C MFR BLD: 7.5 % (ref ?–5.7)
HDLC SERPL-MCNC: 34 MG/DL (ref 40–59)
LDLC SERPL CALC-MCNC: 121 MG/DL (ref ?–100)
NONHDLC SERPL-MCNC: 144 MG/DL (ref ?–130)
OSMOLALITY SERPL CALC.SUM OF ELEC: 284 MOSM/KG (ref 275–295)
POTASSIUM SERPL-SCNC: 4.1 MMOL/L (ref 3.5–5.1)
PROT SERPL-MCNC: 7.4 G/DL (ref 5.7–8.2)
SODIUM SERPL-SCNC: 136 MMOL/L (ref 136–145)
TRIGL SERPL-MCNC: 128 MG/DL (ref 30–149)
VLDLC SERPL CALC-MCNC: 23 MG/DL (ref 0–30)

## 2024-09-26 PROCEDURE — 80061 LIPID PANEL: CPT

## 2024-09-26 PROCEDURE — 99213 OFFICE O/P EST LOW 20 MIN: CPT | Performed by: INTERNAL MEDICINE

## 2024-09-26 PROCEDURE — 36415 COLL VENOUS BLD VENIPUNCTURE: CPT

## 2024-09-26 PROCEDURE — 83036 HEMOGLOBIN GLYCOSYLATED A1C: CPT

## 2024-09-26 PROCEDURE — 80053 COMPREHEN METABOLIC PANEL: CPT

## 2024-09-26 RX ORDER — CODEINE PHOSPHATE/GUAIFENESIN 10-100MG/5
LIQUID (ML) ORAL
Qty: 180 ML | Refills: 0 | Status: SHIPPED | OUTPATIENT
Start: 2024-09-26

## 2024-09-26 RX ORDER — ALBUTEROL SULFATE 90 UG/1
2 INHALANT RESPIRATORY (INHALATION) EVERY 6 HOURS PRN
Qty: 1 EACH | Refills: 0 | Status: SHIPPED | OUTPATIENT
Start: 2024-09-26

## 2024-09-26 NOTE — PROGRESS NOTES
Yon Wheeler is a 74 year old male.  Chief Complaint   Patient presents with    Urgent Care F/u     HPI:    Patient presented today for follow up from urgent care. He was seen on 09/17 at urgent care for positive covid test. He was treated with benzonatate for cough and has been isolating for five days. Patient states that the cough medicine is not helping, he took nyquil as well but it did not help. He will be going back to work from tomorrow and would like to be able to improve the cough. No shortness of breath, no chest pain. Cough is mostly dry.   No other complains  He completed his blood work today but results not available      Current Outpatient Medications   Medication Sig Dispense Refill    guaiFENesin-Codeine 100-10 MG/5ML Oral Syrup 1 tsp qid prn.  Do not drive for 6 hours after taking this medication.  Causes drowsiness. 180 mL 0    albuterol 108 (90 Base) MCG/ACT Inhalation Aero Soln Inhale 2 puffs into the lungs every 6 (six) hours as needed for Wheezing (cough). 1 each 0    metFORMIN 500 MG Oral Tab TAKE 1 TABLET BY MOUTH DAILY WITH BREAKFAST AND 2 TABLETS WITH DINNER 270 tablet 0    Glucose Blood (CONTOUR NEXT TEST) In Vitro Strip Use 1 test strip in Vitro route every other day. 50 strip 3      Past Medical History:    Diabetes (HCC)    Sleep apnea      History reviewed. No pertinent surgical history.   Social History:  Social History     Socioeconomic History    Marital status:    Tobacco Use    Smoking status: Never    Smokeless tobacco: Never   Vaping Use    Vaping status: Never Used   Substance and Sexual Activity    Alcohol use: No    Drug use: No     Social Determinants of Health      [facility]    Social Connections    [facility]    Housing Stability      Family History   Problem Relation Age of Onset    Diabetes Father     Glaucoma Neg     Macular  degeneration Neg       No Known Allergies     REVIEW OF SYSTEMS:   Review of Systems   Review of Systems   Constitutional: Negative for activity change, appetite change and fever.   HENT: Negative for congestion and voice change.    Respiratory: Negative for cough and shortness of breath.    Cardiovascular: Negative for chest pain.   Gastrointestinal: Negative for abdominal distention, abdominal pain and vomiting.   Genitourinary: Negative for hematuria.   Skin: Negative for wound.   Psychiatric/Behavioral: Negative for behavioral problems.   Wt Readings from Last 5 Encounters:   09/26/24 180 lb (81.6 kg)   02/15/24 177 lb (80.3 kg)   02/21/23 183 lb (83 kg)   01/10/23 183 lb (83 kg)   09/08/17 190 lb (86.2 kg)     Body mass index is 29.45 kg/m².      EXAM:   /78   Pulse 86   Resp 18   Ht 5' 5.55\" (1.665 m)   Wt 180 lb (81.6 kg)   BMI 29.45 kg/m²   Physical Exam   Constitutional:       Appearance: Normal appearance.   HENT:      Head: Normocephalic.   Eyes:      Conjunctiva/sclera: Conjunctivae normal.   Cardiovascular:      Rate and Rhythm: Normal rate and regular rhythm.      Heart sounds: Normal heart sounds. No murmur heard.  Pulmonary:      Effort: Pulmonary effort is normal.      Breath sounds: Normal breath sounds. No rhonchi or rales.   Abdominal:      General: Bowel sounds are normal.      Palpations: Abdomen is soft.      Tenderness: There is no abdominal tenderness.   Musculoskeletal:      Cervical back: Neck supple.      Right lower leg: No edema.      Left lower leg: No edema.   Skin:     General: Skin is warm and dry.   Neurological:      General: No focal deficit present.      Mental Status: He is alert and oriented to person, place, and time. Mental status is at baseline.   Psychiatric:         Mood and Affect: Mood normal.         Behavior: Behavior normal.       ASSESSMENT AND PLAN:   1. Acute bronchitis, unspecified organism  2. COVID-19  - tylenol as needed  - increase hydration   -  guaiFENesin-Codeine 100-10 MG/5ML Oral Syrup; 1 tsp qid prn.  Do not drive for 6 hours after taking this medication.  Causes drowsiness.  Dispense: 180 mL; Refill: 0  - albuterol 108 (90 Base) MCG/ACT Inhalation Aero Soln; Inhale 2 puffs into the lungs every 6 (six) hours as needed for Wheezing (cough).  Dispense: 1 each; Refill: 0      The patient indicates understanding of these issues and agrees to the plan.  No follow-ups on file.    Marine Page MD

## 2024-11-05 ENCOUNTER — OFFICE VISIT (OUTPATIENT)
Dept: INTERNAL MEDICINE CLINIC | Facility: CLINIC | Age: 74
End: 2024-11-05
Payer: MEDICARE

## 2024-11-05 VITALS
HEART RATE: 94 BPM | DIASTOLIC BLOOD PRESSURE: 73 MMHG | SYSTOLIC BLOOD PRESSURE: 132 MMHG | BODY MASS INDEX: 29.96 KG/M2 | WEIGHT: 182 LBS | HEIGHT: 65.55 IN

## 2024-11-05 DIAGNOSIS — Z79.4 TYPE 2 DIABETES MELLITUS WITHOUT COMPLICATION, WITH LONG-TERM CURRENT USE OF INSULIN (HCC): ICD-10-CM

## 2024-11-05 DIAGNOSIS — E11.9 TYPE 2 DIABETES MELLITUS WITHOUT COMPLICATION, WITH LONG-TERM CURRENT USE OF INSULIN (HCC): ICD-10-CM

## 2024-11-05 DIAGNOSIS — H25.13 AGE-RELATED NUCLEAR CATARACT OF BOTH EYES: ICD-10-CM

## 2024-11-05 DIAGNOSIS — Z00.00 MEDICARE ANNUAL WELLNESS VISIT, SUBSEQUENT: Primary | ICD-10-CM

## 2024-11-05 DIAGNOSIS — D22.9 NUMEROUS SKIN MOLES: ICD-10-CM

## 2024-11-05 DIAGNOSIS — E78.00 PURE HYPERCHOLESTEROLEMIA: ICD-10-CM

## 2024-11-05 DIAGNOSIS — L30.9 DERMATITIS: ICD-10-CM

## 2024-11-05 DIAGNOSIS — Z01.00 DIABETIC EYE EXAM (HCC): ICD-10-CM

## 2024-11-05 DIAGNOSIS — E11.9 DIABETIC EYE EXAM (HCC): ICD-10-CM

## 2024-11-05 PROCEDURE — 1170F FXNL STATUS ASSESSED: CPT | Performed by: INTERNAL MEDICINE

## 2024-11-05 PROCEDURE — 96160 PT-FOCUSED HLTH RISK ASSMT: CPT | Performed by: INTERNAL MEDICINE

## 2024-11-05 PROCEDURE — 1126F AMNT PAIN NOTED NONE PRSNT: CPT | Performed by: INTERNAL MEDICINE

## 2024-11-05 PROCEDURE — 1159F MED LIST DOCD IN RCRD: CPT | Performed by: INTERNAL MEDICINE

## 2024-11-05 PROCEDURE — 1160F RVW MEDS BY RX/DR IN RCRD: CPT | Performed by: INTERNAL MEDICINE

## 2024-11-05 PROCEDURE — G0438 PPPS, INITIAL VISIT: HCPCS | Performed by: INTERNAL MEDICINE

## 2024-11-05 PROCEDURE — 99214 OFFICE O/P EST MOD 30 MIN: CPT | Performed by: INTERNAL MEDICINE

## 2024-11-05 RX ORDER — ATORVASTATIN CALCIUM 10 MG/1
10 TABLET, FILM COATED ORAL NIGHTLY
Qty: 90 TABLET | Refills: 0 | Status: SHIPPED | OUTPATIENT
Start: 2024-11-05

## 2024-11-05 NOTE — PROGRESS NOTES
Subjective:   Yon Wheeler is a 74 year old male who presents for a MA AHA (Medicare Advantage Annual Health Assessment) and Medicare Subsequent Annual Wellness visit (Pt already had Initial Annual Wellness) and scheduled follow up of multiple significant but stable problems.   Patient presents today for medicare  physical exam,  dm2  hyperlipidemia  , patient states that he is taking metformin 500 mg twice daily his sugars are in the morning mostly 120s to 130 patient states occasionally he checks other times and it is in good range  States has discoloration on the right anterior part of the leg that he has been using hydrocortisone for itchiness and that helps.  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  And doing well  states doing well otherwise, denies chest pain, shortness of breath, dyspnea on exertion or heart palpitations also denies nausea, vomiting, diarrhea, constipation or abdominal pain. No fever, chills, or UTI symptoms.   The rest of the review of systems, see below.        History/Other:   Fall Risk Assessment:   He has been screened for Falls and is low risk.      Cognitive Assessment:   Abnormal  What day of the week is this?: Correct  What month is it?: Correct  What year is it?: Correct  Recall \"Ball\": Correct  Recall \"Flag\": Correct  Recall \"Tree\": Incorrect    Functional Ability/Status:   Yon Wheeler has a completely normal functional assessment. See flowsheet for details.      Depression Screening (PHQ):  PHQ-2 SCORE: 0  , done 11/5/2024   Last [place] Suicide Screening on 11/5/2024 was No Risk.          Advanced Directives:   He does NOT have a Living Will. [Do you have a living will?: No]  He does NOT have a Power of  for Health Care. [Do you have a healthcare power of ?: No]  Discussed Advance Care Planning with patient (and family/surrogate if present). Standard forms made available to patient in After Visit  Summary.      Patient Active Problem List   Diagnosis    Type 2 diabetes mellitus without complication, with long-term current use of insulin (HCC)    Age-related nuclear cataract of both eyes    Vitreous floaters of left eye    Pure hypertriglyceridemia    Diabetic eye exam (Former[facility])    Pure hypercholesterolemia     Allergies:  He has No Known Allergies.    Current Medications:  Outpatient Medications Marked as Taking for the 11/5/24 encounter (Office Visit) with Keyanna Foster MD   Medication Sig    albuterol 108 (90 Base) MCG/ACT Inhalation Aero Soln Inhale 2 puffs into the lungs every 6 (six) hours as needed for Wheezing (cough).    metFORMIN 500 MG Oral Tab TAKE 1 TABLET BY MOUTH DAILY WITH BREAKFAST AND 2 TABLETS WITH DINNER    Glucose Blood (CONTOUR NEXT TEST) In Vitro Strip Use 1 test strip in Vitro route every other day.       Medical History:  He  has a past medical history of Diabetes (Former[facility]) and Sleep apnea.  Surgical History:  He  has no past surgical history on file.   Family History:  His family history includes Diabetes in his father.  Social History:  He  reports that he has never smoked. He has never been exposed to tobacco smoke. He has never used smokeless tobacco. He reports that he does not drink alcohol and does not use drugs.    Tobacco:  He has never smoked tobacco.    CAGE Alcohol Screen:   CAGE screening score of 0 on 11/5/2024, showing low risk of alcohol abuse.      Patient Care Team:  Keyanna Foster MD as PCP - General (Internal Medicine)    Review of Systems   Constitutional:  Negative for chills, fatigue and fever.   HENT:  Negative for ear pain and sore throat.    Eyes:  Negative for pain and redness.   Respiratory:  Negative for cough, shortness of breath and wheezing.    Cardiovascular:  Negative for chest pain, palpitations and leg swelling.   Gastrointestinal:  Negative for abdominal pain, constipation, diarrhea, nausea and vomiting.   Genitourinary:  Negative for dysuria and  frequency.   Skin:  Negative for pallor.   Neurological:  Negative for dizziness and headaches.   Psychiatric/Behavioral:  Negative for confusion. The patient is not nervous/anxious.           Objective:   Physical Exam  Vitals and nursing note reviewed.   Constitutional:       General: He is not in acute distress.  HENT:      Head: Normocephalic and atraumatic.      Right Ear: Tympanic membrane, ear canal and external ear normal.      Left Ear: Tympanic membrane, ear canal and external ear normal.      Nose: Nose normal.      Mouth/Throat:      Pharynx: Uvula midline. No oropharyngeal exudate or posterior oropharyngeal erythema.   Eyes:      General: No scleral icterus.        Right eye: No discharge.         Left eye: No discharge.   Cardiovascular:      Rate and Rhythm: Normal rate and regular rhythm.      Heart sounds: Normal heart sounds. No murmur heard.  Pulmonary:      Effort: Pulmonary effort is normal. No respiratory distress.      Breath sounds: Normal breath sounds. No wheezing or rales.   Abdominal:      Palpations: Abdomen is soft. There is no mass.      Tenderness: There is no abdominal tenderness. There is no right CVA tenderness or left CVA tenderness.      Comments: No hepatomegaly, no splenomegaly   Musculoskeletal:      Cervical back: Neck supple.      Right lower leg: No edema.      Left lower leg: No edema.   Lymphadenopathy:      Cervical: No cervical adenopathy.   Skin:     General: Skin is warm and dry.      Comments: Discoloration localized- ant area    pruritus     RLL     Neurological:      Mental Status: He is alert and oriented to person, place, and time.   Psychiatric:         Behavior: Behavior normal.          /73 (BP Location: Left arm, Patient Position: Sitting, Cuff Size: large)   Pulse 94   Ht 5' 5.55\" (1.665 m)   Wt 182 lb (82.6 kg)   BMI 29.78 kg/m²  Estimated body mass index is 29.78 kg/m² as calculated from the following:    Height as of this encounter: 5' 5.55\"  (1.665 m).    Weight as of this encounter: 182 lb (82.6 kg).    Medicare Hearing Assessment:   Hearing Screening    Time taken: 11/5/2024  1:55 PM  Entry User: Ranjan Sandoval LPN  Screening Method: Questionnaire  I have a problem hearing over the telephone: No I have trouble following the conversations when two or more people are talking at the same time: No   I have trouble understanding things on the TV: No I have to strain to understand conversations: No   I have to worry about missing the telephone ring or doorbell: No I have trouble hearing conversations in a noisy background such as a crowded room or restaurant: No   I get confused about where sounds come from: No I misunderstand some words in a sentence and need to ask people to repeat themselves: No   I especially have trouble understanding the speech of women and children: No I have trouble understanding the speaker in a large room such as at a meeting or place of Catholic: No   Many people I talk to seem to mumble (or don't speak clearly): No People get annoyed because I misunderstand what they say: No   I misunderstand what others are saying and make inappropriate responses: No I avoid social activities because I cannot hear well and fear I will reply improperly: No   Family members and friends have told me they think I may have hearing loss: No             Visual Acuity:   Right Eye Visual Acuity: Uncorrected Right Eye Chart Acuity: 20/200   Left Eye Visual Acuity: Uncorrected Left Eye Chart Acuity: 20/40   Both Eyes Visual Acuity: Uncorrected Both Eyes Chart Acuity: 20/25            Assessment & Plan:   Yon Wheeler is a 74 year old male who presents for a Medicare Assessment.         1. Medicare annual wellness visit, subsequent (Primary)  Maintain a healthy diet , low saturated fat and low sugar diet  Keep good hydration  Maintain a regular activity /walking as tolerated   Complete labs as ordered,   Colonocopy 2028 -- utd  Preventative health  maintenance tests reviewed   Immunizations reviewed disussed   Prevnar 20 , covid 19 , tdap , shingrix - pt  refusing all shots   Poa - pt   patient stated he will think about it ,does not know  yet .  Patient verbalized understanding and compliance        Type 2 diabetes mellitus without complication, with long-term current use of insulin (HCC)  (primary encounter diagnosis)    Keep sugars glycemic control to prevent complications of DM2  Keep 1800 chicho ADA- Diabetic diet   Walking and activity as tolerated   accu-checks   Eye exam 2022 referred  -to have regular eye exams patient says he has appointment with eye doctor tomorrow   and foot exam yearly -refer to podiatry  Take medications as prescribed  metformin 500  Mg w  breakfast and  With diner   - increase  to  tid  with meals   Last A1c is at 7.5   keep with low sugar and carbohydrate diet continue   Keep sugars 120-130 range, 2 hours after meal less than 140 preferably  130 range   Labs to complete 3 months   Discussed with patient regarding ACE inhibitor as kidney protection-due to diabetes starting a small dosage patient is not interested at this time-.  Directions and side effects of medications discussed w/pt  Pt verbalized understanding and compliance  Labs to complete monitor-      Hyperlipidemia  Keep low saturated fat  diet   Avoid red meat and fast, fried food  Take fruits and vegetables   Keep active /walking ,exercise as  tolerated  Reach healthy weight   Continue present management patient agrees to start with small dosage of the statin start atorvastatin  10 mg at bedtime   Side effects and directions of medication discussed with patient. Patient verbalized understanding and compliance.    Labs in 3 months   Follow-up visit after labs in 3 months   Labs   Numerous moles of the skin  Dermatitis right lower leg  Hydrocortisone over-the-counter as needed avoid using too much and prolonged due to discoloration of the skin    Patient to see  dermatologist referral provide patient    Patient agrees with plan verbalized understanding and compliance        Numerous  skin moles   Dermitis lower leg   Per pt hydrocortisone cream   helps   [place]  to Dermatology       The patient indicates understanding of these issues and agrees to the plan.  Reinforced healthy diet, lifestyle, and exercise.      No follow-ups on file.     Keyanna Foster MD, 11/5/2024     Supplementary Documentation:   General Health:  In the past six months, have you lost more than 10 pounds without trying?: 2 - No  Has your appetite been poor?: No  Type of Diet: Balanced  How does the patient maintain a good energy level?: Appropriate Exercise;Daily Walks  How would you describe your daily physical activity?: Moderate  How would you describe your current health state?: Good  How do you maintain positive mental well-being?: Social Interaction;Visiting Friends;Visiting Family  On a scale of 0 to 10, with 0 being no pain and 10 being severe pain, what is your pain level?: 0 - (None)  In the past six months, have you experienced urine leakage?: 0-No  At any time do you feel concerned for the safety/well-being of yourself and/or your children, in your home or elsewhere?: No  Have you had any immunizations at another office such as Influenza, Hepatitis B, Tetanus, or Pneumococcal?: No    Health Maintenance   Topic Date Due    Pneumococcal Vaccine: 65+ Years (1 of 2 - PCV) Never done    PSA  Never done    Zoster Vaccines (1 of 2) Never done    MA Annual Health Assessment  Never done    Annual Depression Screening  01/01/2024    Diabetes Care Dilated Eye Exam  02/22/2024    COVID-19 Vaccine (5 - 2023-24 season) 09/01/2024    HTN: BP Follow-Up  10/26/2024    Diabetes Care: Microalb/Creat Ratio  01/18/2025    Diabetes Care Foot Exam  02/15/2025    Diabetes Care A1C  03/26/2025    Diabetes Care: GFR  09/26/2025    Colorectal Cancer Screening  06/14/2028    Influenza Vaccine  Completed    Fall  Risk Screening (Annual)  Completed

## 2024-11-15 DIAGNOSIS — E11.9 TYPE 2 DIABETES MELLITUS WITHOUT COMPLICATION, WITH LONG-TERM CURRENT USE OF INSULIN (HCC): ICD-10-CM

## 2024-11-15 DIAGNOSIS — Z79.4 TYPE 2 DIABETES MELLITUS WITHOUT COMPLICATION, WITH LONG-TERM CURRENT USE OF INSULIN (HCC): ICD-10-CM

## 2024-11-20 NOTE — TELEPHONE ENCOUNTER
Refill passed per [facility] protocol.  Requested Prescriptions   Pending Prescriptions Disp Refills    METFORMIN 500 MG Oral Tab [Pharmacy Med Name: METFORMIN  MG TABLET] 270 tablet 0     Sig: TAKE 1 TABLET BY MOUTH DAILY WITH BREAKFAST AND 2 TABLETS WITH DINNER       Diabetes Medication Protocol Passed - 11/20/2024  9:31 AM        Passed - Last A1C < 7.5 and within past 6 months     Lab Results   Component Value Date    A1C 7.5 (H) 09/26/2024             Passed - In person appointment or virtual visit in the past 6 mos or appointment in next 3 mos     Recent Outpatient Visits              2 weeks ago Medicare annual wellness visit, subsequent    [facility], [place] Vukomanovic, Emela, MD    Office Visit    1 month ago Acute bronchitis, unspecified organism    [facility] [place] Kagzi, Yasmin Irfan, MD    Office Visit    9 months ago Type 2 diabetes mellitus without complication, with long-term current use of insulin ([facility])    [facility] Keyanna Garrison MD    Office Visit    1 year ago Type 2 diabetes mellitus without retinopathy ([facility]    1 year ago Reactive depression    [facility] [place] Vukomanovic, Emela, MD    Office Visit                      Passed - Microalbumin procedure in past 12 months or taking ACE/ARB        Passed - EGFRCR or GFRNAA > 50     GFR Evaluation  EGFRCR: 91 , resulted on 9/26/2024          Passed - GFR in the past 12 months           Recent Outpatient Visits              2 weeks ago Medicare annual wellness visit, subsequent    [facility] [place] Vukomanovic, Emela, MD    Office Visit    1 month ago Acute bronchitis, unspecified organism    [facility] [place] Kagzi, Yasmin  MD Jac[place]    Office Visit    9 months ago Type 2 diabetes mellitus without complication, with long-term current use of insulin ([facility])    [facility], Keyanna Garrison MD    Office Visit    1 year ago Type 2 diabetes mellitus without retinopathy ([facility])    [facility], Artem Lilly MD    Office Visit    1 year ago Reactive depression    [facility] [place] Vukomanovic, Emela, MD    Office Visit

## 2025-02-01 DIAGNOSIS — E78.00 PURE HYPERCHOLESTEROLEMIA: ICD-10-CM

## 2025-02-05 RX ORDER — ATORVASTATIN CALCIUM 10 MG/1
10 TABLET, FILM COATED ORAL NIGHTLY
Qty: 90 TABLET | Refills: 3 | Status: SHIPPED | OUTPATIENT
Start: 2025-02-05

## 2025-02-05 NOTE — TELEPHONE ENCOUNTER
Refill passed per [facility] protocol.  Requested Prescriptions   Pending Prescriptions Disp Refills    ATORVASTATIN 10 MG Oral Tab [Pharmacy Med Name: ATORVASTATIN 10 MG TABLET] 90 tablet 0     Sig: TAKE 1 TABLET BY MOUTH EVERY DAY AT NIGHT       Cholesterol Medication Protocol Passed - 2/5/2025  8:18 AM        Passed - ALT < 80     Lab Results   Component Value Date    ALT 15 09/26/2024             Passed - ALT resulted within past year        Passed - Lipid panel within past 12 months     Lab Results   Component Value Date    CHOLEST 178 09/26/2024    TRIG 128 09/26/2024    HDL 34 (L) 09/26/2024     (H) 09/26/2024    VLDL 23 09/26/2024    NONHDLC 144 (H) 09/26/2024             Passed - In person appointment or virtual visit in the past 12 mos or appointment in next 3 mos     Recent Outpatient Visits              3 months ago Medicare annual wellness visit, subsequent    [facility], [place] Vukomanovic, Emela, MD    Office Visit    4 months ago Acute bronchitis, unspecified organism    [facility] [place] Kagzi, Yasmin Irfan, MD    Office Visit    11 months ago Type 2 diabetes mellitus without complication, with long-term current use of insulin ([facility])    [facility] Keyanna Garrison MD    Office Visit    1 year ago Type 2 diabetes mellitus without retinopathy ([facility])    [facility] Artem Lilly MD    Office Visit    1 year ago Reactive depression    [facility] [place] Vukomanovic, Emela, MD    Office Visit                      Passed - Medication is active on med list           Recent Outpatient Visits              3 months ago Medicare annual wellness visit, subsequent    [facility] [place] Vukomanovic, Emela, MD    Office Visit    4 months ago Acute bronchitis, unspecified organism     [facility] [address], [place] Marine Page MD    Office Visit    11 months ago Type 2 diabetes mellitus without complication, with long-term current use of insulin ([facility])    [facility], Keyanna Garrison MD    Office Visit    1 year ago Type 2 diabetes mellitus without retinopathy ([facility])    [facility], Artem Lilly MD    Office Visit    1 year ago Reactive depression    [facility] [place] Vukomanovic, Emela, MD    Office Visit

## 2025-02-06 ENCOUNTER — LAB ENCOUNTER (OUTPATIENT)
Dept: LAB | Age: 75
End: 2025-02-06
Attending: INTERNAL MEDICINE
Payer: MEDICARE

## 2025-02-06 DIAGNOSIS — E78.00 PURE HYPERCHOLESTEROLEMIA: ICD-10-CM

## 2025-02-06 DIAGNOSIS — Z79.4 TYPE 2 DIABETES MELLITUS WITHOUT COMPLICATION, WITH LONG-TERM CURRENT USE OF INSULIN (HCC): ICD-10-CM

## 2025-02-06 DIAGNOSIS — E11.9 TYPE 2 DIABETES MELLITUS WITHOUT COMPLICATION, WITH LONG-TERM CURRENT USE OF INSULIN (HCC): ICD-10-CM

## 2025-02-06 LAB
ALBUMIN SERPL-MCNC: 4.3 G/DL (ref 3.2–4.8)
ALBUMIN/GLOB SERPL: 1.4 {RATIO} (ref 1–2)
ALP LIVER SERPL-CCNC: 68 U/L
ALT SERPL-CCNC: 12 U/L
ANION GAP SERPL CALC-SCNC: 5 MMOL/L (ref 0–18)
AST SERPL-CCNC: 12 U/L (ref ?–34)
BASOPHILS # BLD AUTO: 0.08 X10(3) UL (ref 0–0.2)
BASOPHILS NFR BLD AUTO: 1.5 %
BILIRUB SERPL-MCNC: 1 MG/DL (ref 0.2–1.1)
BUN BLD-MCNC: 11 MG/DL (ref 9–23)
BUN/CREAT SERPL: 11.1 (ref 10–20)
CALCIUM BLD-MCNC: 9.1 MG/DL (ref 8.7–10.4)
CHLORIDE SERPL-SCNC: 100 MMOL/L (ref 98–112)
CHOLEST SERPL-MCNC: 192 MG/DL (ref ?–200)
CO2 SERPL-SCNC: 30 MMOL/L (ref 21–32)
CREAT BLD-MCNC: 0.99 MG/DL
DEPRECATED RDW RBC AUTO: 38.2 FL (ref 35.1–46.3)
EGFRCR SERPLBLD CKD-EPI 2021: 81 ML/MIN/1.73M2 (ref 60–?)
EOSINOPHIL # BLD AUTO: 0.21 X10(3) UL (ref 0–0.7)
EOSINOPHIL NFR BLD AUTO: 3.9 %
ERYTHROCYTE [DISTWIDTH] IN BLOOD BY AUTOMATED COUNT: 12.2 % (ref 11–15)
EST. AVERAGE GLUCOSE BLD GHB EST-MCNC: 166 MG/DL (ref 68–126)
FASTING PATIENT LIPID ANSWER: YES
FASTING STATUS PATIENT QL REPORTED: YES
GLOBULIN PLAS-MCNC: 3.1 G/DL (ref 2–3.5)
GLUCOSE BLD-MCNC: 141 MG/DL (ref 70–99)
HBA1C MFR BLD: 7.4 % (ref ?–5.7)
HCT VFR BLD AUTO: 40.5 %
HDLC SERPL-MCNC: 39 MG/DL (ref 40–59)
HGB BLD-MCNC: 14.1 G/DL
IMM GRANULOCYTES # BLD AUTO: 0.01 X10(3) UL (ref 0–1)
IMM GRANULOCYTES NFR BLD: 0.2 %
LDLC SERPL CALC-MCNC: 129 MG/DL (ref ?–100)
LYMPHOCYTES # BLD AUTO: 2.07 X10(3) UL (ref 1–4)
LYMPHOCYTES NFR BLD AUTO: 38.5 %
MCH RBC QN AUTO: 29.7 PG (ref 26–34)
MCHC RBC AUTO-ENTMCNC: 34.8 G/DL (ref 31–37)
MCV RBC AUTO: 85.3 FL
MONOCYTES # BLD AUTO: 0.48 X10(3) UL (ref 0.1–1)
MONOCYTES NFR BLD AUTO: 8.9 %
NEUTROPHILS # BLD AUTO: 2.52 X10 (3) UL (ref 1.5–7.7)
NEUTROPHILS # BLD AUTO: 2.52 X10(3) UL (ref 1.5–7.7)
NEUTROPHILS NFR BLD AUTO: 47 %
NONHDLC SERPL-MCNC: 153 MG/DL (ref ?–130)
OSMOLALITY SERPL CALC.SUM OF ELEC: 282 MOSM/KG (ref 275–295)
PLATELET # BLD AUTO: 214 10(3)UL (ref 150–450)
POTASSIUM SERPL-SCNC: 4.7 MMOL/L (ref 3.5–5.1)
PROT SERPL-MCNC: 7.4 G/DL (ref 5.7–8.2)
RBC # BLD AUTO: 4.75 X10(6)UL
SODIUM SERPL-SCNC: 135 MMOL/L (ref 136–145)
TRIGL SERPL-MCNC: 130 MG/DL (ref 30–149)
TSI SER-ACNC: 0.85 UIU/ML (ref 0.55–4.78)
VLDLC SERPL CALC-MCNC: 23 MG/DL (ref 0–30)
WBC # BLD AUTO: 5.4 X10(3) UL (ref 4–11)

## 2025-02-06 PROCEDURE — 85025 COMPLETE CBC W/AUTO DIFF WBC: CPT

## 2025-02-06 PROCEDURE — 84443 ASSAY THYROID STIM HORMONE: CPT

## 2025-02-06 PROCEDURE — 36415 COLL VENOUS BLD VENIPUNCTURE: CPT

## 2025-02-06 PROCEDURE — 80061 LIPID PANEL: CPT

## 2025-02-06 PROCEDURE — 83036 HEMOGLOBIN GLYCOSYLATED A1C: CPT

## 2025-02-06 PROCEDURE — 80053 COMPREHEN METABOLIC PANEL: CPT

## 2025-04-14 DIAGNOSIS — Z79.4 TYPE 2 DIABETES MELLITUS WITHOUT COMPLICATION, WITH LONG-TERM CURRENT USE OF INSULIN (HCC): ICD-10-CM

## 2025-04-14 DIAGNOSIS — E11.9 TYPE 2 DIABETES MELLITUS WITHOUT COMPLICATION, WITH LONG-TERM CURRENT USE OF INSULIN (HCC): ICD-10-CM

## 2025-06-30 ENCOUNTER — NURSE TRIAGE (OUTPATIENT)
Dept: INTERNAL MEDICINE CLINIC | Facility: CLINIC | Age: 75
End: 2025-06-30

## 2025-06-30 NOTE — TELEPHONE ENCOUNTER
Action Requested: Summary for Provider     []  Critical Lab, Recommendations Needed  [] Need Additional Advice  []   FYI    []   Need Orders  [] Need Medications Sent to Pharmacy  []  Other     SUMMARY:appt made, stated dizzy over a week only when lying down, staying hydrated -advised ER if s/s worsen with n/v, need support standing with dizziness, verbalized understanding      Reason for call: Dizziness  Onset: 1 week                    Reason for Disposition   Patient wants to be seen    Protocols used: Dizziness-A-OH

## 2025-07-03 ENCOUNTER — OFFICE VISIT (OUTPATIENT)
Dept: INTERNAL MEDICINE CLINIC | Facility: CLINIC | Age: 75
End: 2025-07-03

## 2025-07-03 ENCOUNTER — LAB ENCOUNTER (OUTPATIENT)
Dept: LAB | Age: 75
End: 2025-07-03
Attending: INTERNAL MEDICINE
Payer: MEDICARE

## 2025-07-03 VITALS
DIASTOLIC BLOOD PRESSURE: 81 MMHG | HEIGHT: 65.5 IN | WEIGHT: 183 LBS | BODY MASS INDEX: 30.12 KG/M2 | TEMPERATURE: 97 F | SYSTOLIC BLOOD PRESSURE: 128 MMHG | HEART RATE: 78 BPM | OXYGEN SATURATION: 97 %

## 2025-07-03 DIAGNOSIS — R42 DIZZINESS: ICD-10-CM

## 2025-07-03 DIAGNOSIS — R42 DIZZINESS: Primary | ICD-10-CM

## 2025-07-03 DIAGNOSIS — Z79.4 TYPE 2 DIABETES MELLITUS WITHOUT COMPLICATION, WITH LONG-TERM CURRENT USE OF INSULIN (HCC): ICD-10-CM

## 2025-07-03 DIAGNOSIS — E11.9 TYPE 2 DIABETES MELLITUS WITHOUT COMPLICATION, WITH LONG-TERM CURRENT USE OF INSULIN (HCC): ICD-10-CM

## 2025-07-03 LAB
ALBUMIN SERPL-MCNC: 4.6 G/DL (ref 3.2–4.8)
ALBUMIN/GLOB SERPL: 1.6 {RATIO} (ref 1–2)
ALP LIVER SERPL-CCNC: 68 U/L (ref 45–117)
ALT SERPL-CCNC: 17 U/L (ref 10–49)
ANION GAP SERPL CALC-SCNC: 6 MMOL/L (ref 0–18)
AST SERPL-CCNC: 15 U/L (ref ?–34)
BASOPHILS # BLD AUTO: 0.09 X10(3) UL (ref 0–0.2)
BASOPHILS NFR BLD AUTO: 1.7 %
BILIRUB SERPL-MCNC: 0.8 MG/DL (ref 0.2–1.1)
BUN BLD-MCNC: 13 MG/DL (ref 9–23)
BUN/CREAT SERPL: 11.8 (ref 10–20)
CALCIUM BLD-MCNC: 9.7 MG/DL (ref 8.7–10.4)
CHLORIDE SERPL-SCNC: 101 MMOL/L (ref 98–112)
CO2 SERPL-SCNC: 28 MMOL/L (ref 21–32)
CREAT BLD-MCNC: 1.1 MG/DL (ref 0.7–1.3)
DEPRECATED RDW RBC AUTO: 38.3 FL (ref 35.1–46.3)
EGFRCR SERPLBLD CKD-EPI 2021: 71 ML/MIN/1.73M2 (ref 60–?)
EOSINOPHIL # BLD AUTO: 0.37 X10(3) UL (ref 0–0.7)
EOSINOPHIL NFR BLD AUTO: 7.2 %
ERYTHROCYTE [DISTWIDTH] IN BLOOD BY AUTOMATED COUNT: 12.2 % (ref 11–15)
FASTING STATUS PATIENT QL REPORTED: NO
GLOBULIN PLAS-MCNC: 2.9 G/DL (ref 2–3.5)
GLUCOSE BLD-MCNC: 141 MG/DL (ref 70–99)
HCT VFR BLD AUTO: 41.5 % (ref 39–53)
HGB BLD-MCNC: 14 G/DL (ref 13–17.5)
IMM GRANULOCYTES # BLD AUTO: 0.01 X10(3) UL (ref 0–1)
IMM GRANULOCYTES NFR BLD: 0.2 %
LYMPHOCYTES # BLD AUTO: 1.68 X10(3) UL (ref 1–4)
LYMPHOCYTES NFR BLD AUTO: 32.6 %
MCH RBC QN AUTO: 29 PG (ref 26–34)
MCHC RBC AUTO-ENTMCNC: 33.7 G/DL (ref 31–37)
MCV RBC AUTO: 85.9 FL (ref 80–100)
MONOCYTES # BLD AUTO: 0.45 X10(3) UL (ref 0.1–1)
MONOCYTES NFR BLD AUTO: 8.7 %
NEUTROPHILS # BLD AUTO: 2.56 X10 (3) UL (ref 1.5–7.7)
NEUTROPHILS # BLD AUTO: 2.56 X10(3) UL (ref 1.5–7.7)
NEUTROPHILS NFR BLD AUTO: 49.6 %
OSMOLALITY SERPL CALC.SUM OF ELEC: 282 MOSM/KG (ref 275–295)
PLATELET # BLD AUTO: 225 10(3)UL (ref 150–450)
POTASSIUM SERPL-SCNC: 4.4 MMOL/L (ref 3.5–5.1)
PROT SERPL-MCNC: 7.5 G/DL (ref 5.7–8.2)
RBC # BLD AUTO: 4.83 X10(6)UL (ref 3.8–5.8)
SODIUM SERPL-SCNC: 135 MMOL/L (ref 136–145)
WBC # BLD AUTO: 5.2 X10(3) UL (ref 4–11)

## 2025-07-03 PROCEDURE — 1126F AMNT PAIN NOTED NONE PRSNT: CPT | Performed by: INTERNAL MEDICINE

## 2025-07-03 PROCEDURE — 99213 OFFICE O/P EST LOW 20 MIN: CPT | Performed by: INTERNAL MEDICINE

## 2025-07-03 PROCEDURE — 85025 COMPLETE CBC W/AUTO DIFF WBC: CPT

## 2025-07-03 PROCEDURE — 1160F RVW MEDS BY RX/DR IN RCRD: CPT | Performed by: INTERNAL MEDICINE

## 2025-07-03 PROCEDURE — 80053 COMPREHEN METABOLIC PANEL: CPT

## 2025-07-03 PROCEDURE — 36415 COLL VENOUS BLD VENIPUNCTURE: CPT

## 2025-07-03 PROCEDURE — 1159F MED LIST DOCD IN RCRD: CPT | Performed by: INTERNAL MEDICINE

## 2025-07-03 RX ORDER — LATANOPROST 50 UG/ML
1 SOLUTION/ DROPS OPHTHALMIC NIGHTLY
COMMUNITY
Start: 2025-06-29

## 2025-07-03 NOTE — PROGRESS NOTES
Yon Wheeler is a 74 year old male.  Chief Complaint   Patient presents with    Dizziness     HPI:        The following individual(s) verbally consented to be recorded using ambient AI listening technology and understand that they can each withdraw their consent to this listening technology at any point by asking the clinician to turn off or pause the recording:    Patient name: Yon Wheeler is a 74 year old male who presents with dizziness when lying down.    He experiences dizziness primarily when lying down, which has been occurring for the past couple of weeks. The dizziness occurs immediately upon lying down and improves when he changes position or stands up. No dizziness is noted while walking or sitting up.    No associated symptoms such as nausea, vomiting, chest pain, palpitations, or trouble breathing. No recent upper respiratory infections or colds. He mentions that his blood pressure has always been normal and tends to be on the lower side. He denies any history of heart problems.    He is currently taking metformin for diabetes and uses eye drops for high intraocular pressure due to glaucoma. He notes that one eye's pressure is improving while the other remains slightly elevated.    He works as a CT technician and states that his dizziness has not affected his ability to work or his daily activities. He describes himself as 'really busy' and not 'a sitting type and hiding type'.         Current Medications[1]   Past Medical History[2]   Past Surgical History[3]   Social History:  Short Social Hx on File[4]   Family History[5]   Allergies[6]     REVIEW OF SYSTEMS:   Review of Systems   Review of Systems   Constitutional: Negative for activity change, appetite change and fever.   HENT: Negative for congestion and voice change.    Respiratory: Negative for cough and shortness of breath.    Cardiovascular: Negative for chest pain.   Gastrointestinal: Negative for  abdominal distention, abdominal pain and vomiting.   Genitourinary: Negative for hematuria.   Skin: Negative for wound.   Psychiatric/Behavioral: Negative for behavioral problems.   Wt Readings from Last 5 Encounters:   07/03/25 183 lb (83 kg)   11/05/24 182 lb (82.6 kg)   09/26/24 180 lb (81.6 kg)   02/15/24 177 lb (80.3 kg)   02/21/23 183 lb (83 kg)     Body mass index is 29.99 kg/m².      EXAM:   /81   Pulse 78   Temp 97.4 °F (36.3 °C) (Temporal)   Ht 5' 5.5\" (1.664 m)   Wt 183 lb (83 kg)   SpO2 97%   BMI 29.99 kg/m²   Physical Exam   Constitutional:       Appearance: Normal appearance.   HENT:      Head: Normocephalic.   Eyes:      Conjunctiva/sclera: Conjunctivae normal.   Breast:  Normal bilateral breast exam. No palpable masses or nodules.   No nipples asymmetry or discharge. No skin changes   Cardiovascular:      Rate and Rhythm: Normal rate and regular rhythm.      Heart sounds: Normal heart sounds. No murmur heard.  Pulmonary:      Effort: Pulmonary effort is normal.      Breath sounds: Normal breath sounds. No rhonchi or rales.   Abdominal:      General: Bowel sounds are normal.      Palpations: Abdomen is soft.      Tenderness: There is no abdominal tenderness.   Musculoskeletal:      Cervical back: Neck supple.      Right lower leg: No edema.      Left lower leg: No edema.   Skin:     General: Skin is warm and dry.   Neurological:      General: No focal deficit present.      Mental Status: He is alert and oriented to person, place, and time. Mental status is at baseline.   Psychiatric:         Mood and Affect: Mood normal.         Behavior: Behavior normal.       ASSESSMENT AND PLAN:        Positional Vertigo  Dizziness primarily when lying down or changing head positions for a couple of weeks. No associated nausea, vomiting, chest pain, palpitations, or respiratory issues. Dizziness resolves upon standing or changing position. Differential diagnosis includes benign paroxysmal positional  vertigo (BPPV) and other vestibular disorders. Typically low blood pressure, but not believed to be the cause.  - Order CT scan of the head to rule out intracranial causes.  - Order echocardiogram to assess cardiac function.  - Order blood work to check electrolytes, particularly sodium levels.  - Offer meclizine for symptomatic relief of dizziness, but he is undecided about taking it.  Orders:    Comp Metabolic Panel (14); Future    CBC W Differential W Platelet [E]; Future    CT BRAIN OR HEAD (CPT=70450); Future    CARD ECHO 2D DOPPLER (CPT=93306); Future    Glaucoma  Diagnosed with glaucoma, currently using eye drops to manage intraocular pressure. Reports improvement in one eye, while the other eye still has elevated pressure.  - Continue current eye drop regimen as prescribed by ophthalmologist.  - Follow up with ophthalmologist for ongoing management of intraocular pressure.    Diabetes Mellitus Type 2  On metformin for diabetes management.  - Continue metformin as prescribed.          The patient indicates understanding of these issues and agrees to the plan.      Marine Page MD          [1]   Current Outpatient Medications   Medication Sig Dispense Refill    latanoprost 0.005 % Ophthalmic Solution Place 1 drop into both eyes nightly.      Glucose Blood (CONTOUR NEXT TEST) In Vitro Strip Use 1 test strip in Vitro route every other day. 50 strip 3    atorvastatin 10 MG Oral Tab Take 1 tablet (10 mg total) by mouth nightly. 90 tablet 3    metFORMIN 500 MG Oral Tab TAKE 1 TABLET BY MOUTH DAILY WITH BREAKFAST AND 2 TABLETS WITH DINNER 270 tablet 3    albuterol 108 (90 Base) MCG/ACT Inhalation Aero Soln Inhale 2 puffs into the lungs every 6 (six) hours as needed for Wheezing (cough). 1 each 0   [2]   Past Medical History:   Diabetes (HCC)    Sleep apnea   [3] History reviewed. No pertinent surgical history.  [4]   Social History  Socioeconomic History    Marital status:    Tobacco Use    Smoking  status: Never     Passive exposure: Never    Smokeless tobacco: Never   Vaping Use    Vaping status: Never Used   Substance and Sexual Activity    Alcohol use: No    Drug use: No     Social Drivers of Health      Received from [facility]    Housing Stability   [5]   Family History  Problem Relation Age of Onset    Diabetes Father     Glaucoma Neg     Macular degeneration Neg    [6] No Known Allergies

## 2025-07-10 ENCOUNTER — HOSPITAL ENCOUNTER (OUTPATIENT)
Dept: CT IMAGING | Facility: HOSPITAL | Age: 75
Discharge: HOME | End: 2025-07-10
Attending: INTERNAL MEDICINE
Payer: MEDICARE

## 2025-07-10 DIAGNOSIS — R42 DIZZINESS: ICD-10-CM

## 2025-07-10 PROCEDURE — 70450 CT HEAD/BRAIN W/O DYE: CPT | Performed by: INTERNAL MEDICINE

## 2025-07-16 ENCOUNTER — HOSPITAL ENCOUNTER (OUTPATIENT)
Dept: CV DIAGNOSTICS | Facility: HOSPITAL | Age: 75
Discharge: HOME | End: 2025-07-16
Attending: INTERNAL MEDICINE
Payer: MEDICARE

## 2025-07-16 DIAGNOSIS — R42 DIZZINESS: ICD-10-CM

## 2025-07-16 PROCEDURE — 93306 TTE W/DOPPLER COMPLETE: CPT | Performed by: INTERNAL MEDICINE

## 2025-07-18 ENCOUNTER — HOSPITAL ENCOUNTER (OUTPATIENT)
Dept: ULTRASOUND IMAGING | Facility: HOSPITAL | Age: 75
Discharge: HOME | End: 2025-07-18
Attending: INTERNAL MEDICINE
Payer: MEDICARE

## 2025-07-18 DIAGNOSIS — R42 DIZZINESS: ICD-10-CM

## 2025-07-18 PROCEDURE — 93880 EXTRACRANIAL BILAT STUDY: CPT | Performed by: INTERNAL MEDICINE

## (undated) NOTE — LETTER
Gelacio Becerra, [address] [place] [address]       09/08/17        Patient: Brian Beltran   YOB: 1950   Date of Visit: 9/8/2017       Dear  Dr. Toño Prado MD,      Thank you for referring Brian Beltran to

## (undated) NOTE — LETTER
Date & Time: 9/17/2024, 8:34 AM  Patient: Yon Wheeler  Encounter Provider(s):    Nohemi Parisi DO       To Whom It May Concern:    Yon Wheeler was seen and treated in our department on 9/17/2024. He should follow CDC guidance for preventing spread of respiratory viruses when sick. People with a respiratory virus should stay away from others until at least 24 hours after both symptoms are improving overall and they have not had a fever without the use of fever-reducing medication.  For 5 additional days, people should consider added precautions such as hygiene, use of masks, physical distancing, and steps for  air.\" .    If you have any questions or concerns, please do not hesitate to call.        __Nohemi Parisi DO___________________________  Physician/[facility] Signature